# Patient Record
Sex: MALE | Race: WHITE | Employment: FULL TIME | URBAN - METROPOLITAN AREA
[De-identification: names, ages, dates, MRNs, and addresses within clinical notes are randomized per-mention and may not be internally consistent; named-entity substitution may affect disease eponyms.]

---

## 2017-01-24 DIAGNOSIS — L70.9 ACNE: ICD-10-CM

## 2017-01-26 RX ORDER — TRETINOIN 1 MG/G
GEL TOPICAL
Qty: 45 G | Refills: 1 | Status: SHIPPED | OUTPATIENT
Start: 2017-01-26 | End: 2018-08-29 | Stop reason: SDUPTHER

## 2018-08-03 DIAGNOSIS — L70.9 ACNE: ICD-10-CM

## 2018-08-03 RX ORDER — TRETINOIN 1 MG/G
GEL TOPICAL
Qty: 45 G | Refills: 0 | OUTPATIENT
Start: 2018-08-03

## 2018-08-27 ENCOUNTER — OFFICE VISIT (OUTPATIENT)
Dept: INTERNAL MEDICINE CLINIC | Age: 28
End: 2018-08-27

## 2018-08-27 VITALS
HEIGHT: 69 IN | SYSTOLIC BLOOD PRESSURE: 110 MMHG | DIASTOLIC BLOOD PRESSURE: 68 MMHG | HEART RATE: 77 BPM | WEIGHT: 128 LBS | BODY MASS INDEX: 18.96 KG/M2 | OXYGEN SATURATION: 97 %

## 2018-08-27 DIAGNOSIS — Z11.59 NEED FOR HEPATITIS C SCREENING TEST: ICD-10-CM

## 2018-08-27 DIAGNOSIS — J45.990 ASTHMA, EXERCISE INDUCED: Primary | ICD-10-CM

## 2018-08-27 DIAGNOSIS — Z13.220 SCREENING CHOLESTEROL LEVEL: ICD-10-CM

## 2018-08-27 PROCEDURE — 99213 OFFICE O/P EST LOW 20 MIN: CPT | Performed by: INTERNAL MEDICINE

## 2018-08-27 RX ORDER — ALBUTEROL SULFATE 90 UG/1
2 AEROSOL, METERED RESPIRATORY (INHALATION) EVERY 6 HOURS PRN
Qty: 1 INHALER | Refills: 5 | Status: SHIPPED | OUTPATIENT
Start: 2018-08-27 | End: 2020-04-16

## 2018-08-27 ASSESSMENT — PATIENT HEALTH QUESTIONNAIRE - PHQ9
SUM OF ALL RESPONSES TO PHQ9 QUESTIONS 1 & 2: 0
1. LITTLE INTEREST OR PLEASURE IN DOING THINGS: 0
SUM OF ALL RESPONSES TO PHQ QUESTIONS 1-9: 0
2. FEELING DOWN, DEPRESSED OR HOPELESS: 0
SUM OF ALL RESPONSES TO PHQ QUESTIONS 1-9: 0

## 2018-08-27 ASSESSMENT — ASTHMA QUESTIONNAIRES
QUESTION_1 LAST FOUR WEEKS HOW MUCH OF THE TIME DID YOUR ASTHMA KEEP YOU FROM GETTING AS MUCH DONE AT WORK, SCHOOL OR AT HOME: 5
QUESTION_3 LAST FOUR WEEKS HOW OFTEN DID YOUR ASTHMA SYMPTOMS (WHEEZING, COUGHING, SHORTNESS OF BREATH, CHEST TIGHTNESS OR PAIN) WAKE YOU UP AT NIGHT OR EARLIER THAN USUAL IN THE MORNING: 5
QUESTION_4 LAST FOUR WEEKS HOW OFTEN HAVE YOU USED YOUR RESCUE INHALER OR NEBULIZER MEDICATION (SUCH AS ALBUTEROL): 5
ACT_TOTALSCORE: 25
QUESTION_5 LAST FOUR WEEKS HOW WOULD YOU RATE YOUR ASTHMA CONTROL: 5
QUESTION_2 LAST FOUR WEEKS HOW OFTEN HAVE YOU HAD SHORTNESS OF BREATH: 5

## 2018-08-27 NOTE — PROGRESS NOTES
2018     Neftali Barbosa (:  1990) is a 25 y.o. male, here for evaluation of the following medical concerns:    HPI  Asthma:  Current treatment includes beta agonist inhalers. Using preventive medication(s) consistently: not applicable. Residual symptoms: none. Patient denies any other symptoms. He requires his rescue inhaler 0 time(s) per month. Review of Systems    Prior to Visit Medications    Medication Sig Taking? Authorizing Provider   albuterol sulfate  (90 Base) MCG/ACT inhaler Inhale 2 puffs into the lungs every 6 hours as needed for Wheezing Yes Kandy Olvera MD   tretinoin microspheres (RETIN-A MICRO) 0.1 % gel APPLY TOPICALLY NIGHTLY. Yes Kandy Olvera MD        Social History   Substance Use Topics    Smoking status: Current Some Day Smoker     Types: Cigars    Smokeless tobacco: Never Used    Alcohol use 1.0 oz/week     2 Standard drinks or equivalent per week        Vitals:    18 0902   BP: 110/68   Site: Left Arm   Position: Sitting   Cuff Size: Medium Adult   Pulse: 77   SpO2: 97%   Weight: 128 lb (58.1 kg)   Height: 5' 9\" (1.753 m)     Estimated body mass index is 18.9 kg/m² as calculated from the following:    Height as of this encounter: 5' 9\" (1.753 m). Weight as of this encounter: 128 lb (58.1 kg). Physical Exam   Constitutional: He appears well-nourished. No distress. HENT:   Right Ear: External ear normal.   Left Ear: External ear normal.   Mouth/Throat: No oropharyngeal exudate. Eyes: Pupils are equal, round, and reactive to light. EOM are normal. Right eye exhibits no discharge. Left eye exhibits discharge. Cardiovascular: Normal rate, regular rhythm and normal heart sounds. No murmur heard. ASSESSMENT/PLAN:  1. Asthma, exercise induced  stable  - albuterol sulfate  (90 Base) MCG/ACT inhaler; Inhale 2 puffs into the lungs every 6 hours as needed for Wheezing  Dispense: 1 Inhaler; Refill: 5    2.

## 2018-08-29 DIAGNOSIS — L70.9 ACNE, UNSPECIFIED ACNE TYPE: ICD-10-CM

## 2018-08-29 RX ORDER — TRETINOIN 1 MG/G
GEL TOPICAL
Qty: 45 G | Refills: 1 | Status: SHIPPED | OUTPATIENT
Start: 2018-08-29 | End: 2019-09-07 | Stop reason: SDUPTHER

## 2018-08-29 RX ORDER — TRETINOIN 1 MG/G
GEL TOPICAL
Qty: 45 G | Refills: 1 | Status: CANCELLED | OUTPATIENT
Start: 2018-08-29

## 2019-06-05 ENCOUNTER — OFFICE VISIT (OUTPATIENT)
Dept: INTERNAL MEDICINE CLINIC | Age: 29
End: 2019-06-05
Payer: COMMERCIAL

## 2019-06-05 VITALS
DIASTOLIC BLOOD PRESSURE: 76 MMHG | BODY MASS INDEX: 18.46 KG/M2 | SYSTOLIC BLOOD PRESSURE: 110 MMHG | TEMPERATURE: 98.5 F | WEIGHT: 125 LBS

## 2019-06-05 DIAGNOSIS — Z11.59 NEED FOR HEPATITIS C SCREENING TEST: ICD-10-CM

## 2019-06-05 DIAGNOSIS — R59.1 LYMPHADENOPATHY OF HEAD AND NECK: Primary | ICD-10-CM

## 2019-06-05 DIAGNOSIS — Z13.220 SCREENING CHOLESTEROL LEVEL: ICD-10-CM

## 2019-06-05 LAB
A/G RATIO: 1.7 (ref 1.1–2.2)
ALBUMIN SERPL-MCNC: 4.8 G/DL (ref 3.4–5)
ALP BLD-CCNC: 112 U/L (ref 40–129)
ALT SERPL-CCNC: 171 U/L (ref 10–40)
ANION GAP SERPL CALCULATED.3IONS-SCNC: 17 MMOL/L (ref 3–16)
AST SERPL-CCNC: 127 U/L (ref 15–37)
BASOPHILS ABSOLUTE: 0 K/UL (ref 0–0.2)
BASOPHILS RELATIVE PERCENT: 1.5 %
BILIRUB SERPL-MCNC: 0.7 MG/DL (ref 0–1)
BUN BLDV-MCNC: 9 MG/DL (ref 7–20)
CALCIUM SERPL-MCNC: 9.8 MG/DL (ref 8.3–10.6)
CHLORIDE BLD-SCNC: 97 MMOL/L (ref 99–110)
CHOLESTEROL, TOTAL: 148 MG/DL (ref 0–199)
CO2: 26 MMOL/L (ref 21–32)
CREAT SERPL-MCNC: 0.9 MG/DL (ref 0.9–1.3)
EOSINOPHILS ABSOLUTE: 0 K/UL (ref 0–0.6)
EOSINOPHILS RELATIVE PERCENT: 0.3 %
GFR AFRICAN AMERICAN: >60
GFR NON-AFRICAN AMERICAN: >60
GLOBULIN: 2.9 G/DL
GLUCOSE BLD-MCNC: 99 MG/DL (ref 70–99)
HCT VFR BLD CALC: 45.9 % (ref 40.5–52.5)
HDLC SERPL-MCNC: 32 MG/DL (ref 40–60)
HEMOGLOBIN: 15.1 G/DL (ref 13.5–17.5)
HEPATITIS C ANTIBODY INTERPRETATION: NORMAL
LACTATE DEHYDROGENASE: 405 U/L (ref 100–190)
LDL CHOLESTEROL CALCULATED: 91 MG/DL
LYMPHOCYTES ABSOLUTE: 1.2 K/UL (ref 1–5.1)
LYMPHOCYTES RELATIVE PERCENT: 45.5 %
MCH RBC QN AUTO: 27.3 PG (ref 26–34)
MCHC RBC AUTO-ENTMCNC: 32.9 G/DL (ref 31–36)
MCV RBC AUTO: 83 FL (ref 80–100)
MONOCYTES ABSOLUTE: 0.2 K/UL (ref 0–1.3)
MONOCYTES RELATIVE PERCENT: 9.4 %
NEUTROPHILS ABSOLUTE: 1.1 K/UL (ref 1.7–7.7)
NEUTROPHILS RELATIVE PERCENT: 43.3 %
PDW BLD-RTO: 13.8 % (ref 12.4–15.4)
PLATELET # BLD: 120 K/UL (ref 135–450)
PMV BLD AUTO: 9 FL (ref 5–10.5)
POTASSIUM SERPL-SCNC: 4.4 MMOL/L (ref 3.5–5.1)
RBC # BLD: 5.54 M/UL (ref 4.2–5.9)
SEDIMENTATION RATE, ERYTHROCYTE: 12 MM/HR (ref 0–15)
SODIUM BLD-SCNC: 140 MMOL/L (ref 136–145)
TOTAL PROTEIN: 7.7 G/DL (ref 6.4–8.2)
TRIGL SERPL-MCNC: 124 MG/DL (ref 0–150)
VLDLC SERPL CALC-MCNC: 25 MG/DL
WBC # BLD: 2.6 K/UL (ref 4–11)

## 2019-06-05 PROCEDURE — 99213 OFFICE O/P EST LOW 20 MIN: CPT | Performed by: NURSE PRACTITIONER

## 2019-06-05 ASSESSMENT — ENCOUNTER SYMPTOMS
RESPIRATORY NEGATIVE: 1
GASTROINTESTINAL NEGATIVE: 1
SWOLLEN GLANDS: 1

## 2019-06-06 LAB
HIV AG/AB: NORMAL
HIV ANTIGEN: NORMAL
HIV-1 ANTIBODY: NORMAL
HIV-2 AB: NORMAL

## 2019-06-09 LAB
QUANTI TB GOLD PLUS: NEGATIVE
QUANTI TB1 MINUS NIL: 0.34 IU/ML (ref 0–0.34)
QUANTI TB2 MINUS NIL: 0.57 IU/ML (ref 0–0.34)
QUANTIFERON MITOGEN: 2.71 IU/ML
QUANTIFERON NIL: 7.08 IU/ML

## 2019-06-10 ENCOUNTER — TELEPHONE (OUTPATIENT)
Dept: INTERNAL MEDICINE CLINIC | Age: 29
End: 2019-06-10

## 2019-06-10 DIAGNOSIS — R59.9 LYMPH NODE ENLARGEMENT: Primary | ICD-10-CM

## 2019-06-10 NOTE — TELEPHONE ENCOUNTER
Pt. notified, and given contact info for Dr. Jai Mitchell. Referral faxed to [facility] at [phone]. Fax confirmation received.

## 2019-09-07 DIAGNOSIS — L70.9 ACNE, UNSPECIFIED ACNE TYPE: ICD-10-CM

## 2019-09-10 RX ORDER — TRETINOIN 1 MG/G
GEL TOPICAL
Qty: 45 G | Refills: 1 | Status: SHIPPED | OUTPATIENT
Start: 2019-09-10

## 2019-09-16 ENCOUNTER — TELEPHONE (OUTPATIENT)
Dept: PAIN MANAGEMENT | Age: 29
End: 2019-09-16

## 2019-09-26 ENCOUNTER — TELEPHONE (OUTPATIENT)
Dept: INTERNAL MEDICINE CLINIC | Age: 29
End: 2019-09-26

## 2019-09-27 NOTE — TELEPHONE ENCOUNTER
The problem we are running into is who his PRESCRIPTION insurance coverage is through. All of his insurance is up to date but that is his MEDICAL insurance. I will call the pharmacy when they open and see what I can find out. I will make this a priority. Just gilberto. Thank you.

## 2020-12-22 ENCOUNTER — OFFICE VISIT (OUTPATIENT)
Dept: PRIMARY CARE CLINIC | Age: 30
End: 2020-12-22
Payer: COMMERCIAL

## 2020-12-22 PROCEDURE — 99211 OFF/OP EST MAY X REQ PHY/QHP: CPT | Performed by: NURSE PRACTITIONER

## 2020-12-22 NOTE — PROGRESS NOTES
Robbi Mitchell received a viral test for COVID-19. They were educated on isolation and quarantine as appropriate. For any symptoms, they were directed to seek care from their PCP, given contact information to establish with a doctor, directed to an urgent care or the emergency room.

## 2020-12-23 LAB — SARS-COV-2, NAA: DETECTED

## 2021-04-10 ENCOUNTER — IMMUNIZATION (OUTPATIENT)
Dept: FAMILY MEDICINE CLINIC | Age: 31
End: 2021-04-10
Payer: COMMERCIAL

## 2021-04-10 PROCEDURE — 91300 COVID-19, PFIZER VACCINE 30MCG/0.3ML DOSE: CPT | Performed by: FAMILY MEDICINE

## 2021-04-10 PROCEDURE — 0001A COVID-19, PFIZER VACCINE 30MCG/0.3ML DOSE: CPT | Performed by: FAMILY MEDICINE

## 2021-05-01 ENCOUNTER — IMMUNIZATION (OUTPATIENT)
Dept: FAMILY MEDICINE CLINIC | Age: 31
End: 2021-05-01
Payer: COMMERCIAL

## 2021-05-01 PROCEDURE — 0002A COVID-19, PFIZER VACCINE 30MCG/0.3ML DOSE: CPT | Performed by: FAMILY MEDICINE

## 2021-05-01 PROCEDURE — 91300 COVID-19, PFIZER VACCINE 30MCG/0.3ML DOSE: CPT | Performed by: FAMILY MEDICINE
